# Patient Record
Sex: FEMALE | Race: WHITE | NOT HISPANIC OR LATINO | URBAN - NONMETROPOLITAN AREA
[De-identification: names, ages, dates, MRNs, and addresses within clinical notes are randomized per-mention and may not be internally consistent; named-entity substitution may affect disease eponyms.]

---

## 2019-04-29 ENCOUNTER — IMPORTED ENCOUNTER (OUTPATIENT)
Dept: URBAN - NONMETROPOLITAN AREA CLINIC 1 | Facility: CLINIC | Age: 79
End: 2019-04-29

## 2019-04-29 PROCEDURE — 92015 DETERMINE REFRACTIVE STATE: CPT

## 2019-04-29 PROCEDURE — 92014 COMPRE OPH EXAM EST PT 1/>: CPT

## 2020-06-29 ENCOUNTER — IMPORTED ENCOUNTER (OUTPATIENT)
Dept: URBAN - NONMETROPOLITAN AREA CLINIC 1 | Facility: CLINIC | Age: 80
End: 2020-06-29

## 2020-06-29 PROBLEM — H04.123: Noted: 2020-06-29

## 2020-06-29 PROCEDURE — 92014 COMPRE OPH EXAM EST PT 1/>: CPT

## 2020-06-29 NOTE — PATIENT DISCUSSION
"s/p PCIOL-Stable PCIOL OU.-Monitor for PCO. dermatochalasis oumonitor for changesPVD-Retina flat 360 with no breaks tears or heme.-S&S of RD/RT reviewed with pt.-Stressed that pt should contact our office right away with any changes or increase in symptoms. ""REBA-Explained REBA and associated symptoms.-Recommend increasing Omega 3s.-Pt to begin artificial tears OU QID PRN. Pt will contact us if this does not provide relief.  Consider punctal plugs in that case.; 's Notes: Not sure PCP"

## 2021-05-21 NOTE — PATIENT DISCUSSION
Start 81mg low dose Aspirin, Patient to follow up with cardiologist-check 2 D Echo, carotid doppler.  No scalp tenderness, No jaw pain, No evidence of GCA.

## 2022-04-09 ASSESSMENT — VISUAL ACUITY
OU_CC: 20/40
OS_SC: 20/80-1
OS_SC: 20/30
OD_SC: 20/80-1
OD_SC: 20/30

## 2022-04-09 ASSESSMENT — TONOMETRY
OD_IOP_MMHG: 17
OS_IOP_MMHG: 17